# Patient Record
(demographics unavailable — no encounter records)

---

## 2024-10-08 NOTE — PHYSICAL EXAM
[de-identified] : General: Well appearing, no acute distress Neurologic: A&Ox3, No focal deficits Head: NCAT without abrasions, lacerations, or ecchymosis to head, face, or scalp Eyes: No scleral icterus, no gross abnormalities Respiratory: Equal chest wall expansion bilaterally, no accessory muscle use Lymphatic: No lymphadenopathy palpated Skin: Warm and dry Psychiatric: Normal mood and affect   Examination of the right shoulder reveals no obvious deformity.  There is no evidence of muscle atrophy.  The skin is intact.  There is no swelling, erythema, or ecchymosis.  The patient is nontender to palpation throughout the right shoulder including the anterior aspect of the shoulder near the bicipital groove, a.c. joint, trapezius and posterior shoulder.  The patient's active range of motion is as follows: Forward flexion to 110 degrees external rotation to 35 degrees and internal rotation to an upper lumbar level.  Passive forward flexion to 125 degrees. The patient has 5 out of 5 strength to forward flexion with pronation, internal and external rotation.  The patient has negative Waite and Neer's tests.  There is no pain with cross body abduction testing.  There is a negative a.c. compression test and no sulcus sign present.  There is a negative liftoff test and negative yergason test.  The compartments of the arm are soft and nontender without evidence of DVT or compartment syndrome.  The patient is grossly neurovascularly intact distally.   Examination of the left shoulder reveals no obvious deformity.  There is no evidence of muscle atrophy.  The skin is intact.  There is no swelling, erythema, or ecchymosis.  The patient is nontender to palpation throughout the left shoulder including the anterior aspect of the shoulder near the bicipital groove, a.c. joint, trapezius and posterior shoulder.  The patient's active range of motion is as follows: Forward flexion to 175 degrees external rotation to 75 degrees and internal rotation to an upper lumbar level.  The patient has 5 out of 5 strength to forward flexion with pronation, internal and external rotation.  The patient has negative Waite and Neer's tests.  There is no pain with cross body abduction testing.  There is a negative a.c. compression test and no sulcus sign present.  There is a negative liftoff test and negative yergason test.  The compartments of the arm are soft and nontender without evidence of DVT or compartment syndrome.  The patient is grossly neurovascularly intact distally. [de-identified] : The following radiographs were ordered and read by me during this patient's visit. I reviewed each radiograph in detail with the patient and discussed the findings as highlighted below. 4 views of the left shoulder were obtained today that show no fracture, dislocation. There is calcium deposit at L rotator cuff. There is no malalignment. No obvious osseous abnormality. Otherwise unremarkable.

## 2024-10-08 NOTE — HISTORY OF PRESENT ILLNESS
[de-identified] : PENNY is a 69 year female here today for follow up due to R shoulder. SPO: Right reverse total shoulder replacement; DOS: 6/05/2024. She is reporting pain to R shoulder with external rotation. She is reporting pain to her L shoulder at this time.

## 2024-10-08 NOTE — ADDENDUM
[FreeTextEntry1] : Documented by Lidia Pérez acting as a scribe for Dr. Bhatt and Patric Espinoza PA-C on 10/08/2024. All medical record entries made by the Scribe were at my, Dr. Bhatt, and Patric Espinoza's, direction and personally dictated by me on 10/08/2024. I have reviewed the chart and agree that the record accurately reflects my personal performance of the history, physical exam, procedure and imaging.

## 2024-10-08 NOTE — REASON FOR VISIT
[Follow-Up Visit] : a follow-up visit for [Shoulder Pain] : shoulder pain [FreeTextEntry2] : L shoulder, SPO: Right reverse total shoulder replacement; DOS: 6/05/2024

## 2024-10-08 NOTE — REVIEW OF SYSTEMS
[Negative] : Heme/Lymph [FreeTextEntry9] : L shoulder, SPO: Right reverse total shoulder replacement; DOS: 6/05/2024

## 2024-10-08 NOTE — PHYSICAL EXAM
[de-identified] : General: Well appearing, no acute distress Neurologic: A&Ox3, No focal deficits Head: NCAT without abrasions, lacerations, or ecchymosis to head, face, or scalp Eyes: No scleral icterus, no gross abnormalities Respiratory: Equal chest wall expansion bilaterally, no accessory muscle use Lymphatic: No lymphadenopathy palpated Skin: Warm and dry Psychiatric: Normal mood and affect   Examination of the right shoulder reveals no obvious deformity.  There is no evidence of muscle atrophy.  The skin is intact.  There is no swelling, erythema, or ecchymosis.  The patient is nontender to palpation throughout the right shoulder including the anterior aspect of the shoulder near the bicipital groove, a.c. joint, trapezius and posterior shoulder.  The patient's active range of motion is as follows: Forward flexion to 110 degrees external rotation to 35 degrees and internal rotation to an upper lumbar level.  Passive forward flexion to 125 degrees. The patient has 5 out of 5 strength to forward flexion with pronation, internal and external rotation.  The patient has negative Waite and Neer's tests.  There is no pain with cross body abduction testing.  There is a negative a.c. compression test and no sulcus sign present.  There is a negative liftoff test and negative yergason test.  The compartments of the arm are soft and nontender without evidence of DVT or compartment syndrome.  The patient is grossly neurovascularly intact distally.   Examination of the left shoulder reveals no obvious deformity.  There is no evidence of muscle atrophy.  The skin is intact.  There is no swelling, erythema, or ecchymosis.  The patient is nontender to palpation throughout the left shoulder including the anterior aspect of the shoulder near the bicipital groove, a.c. joint, trapezius and posterior shoulder.  The patient's active range of motion is as follows: Forward flexion to 175 degrees external rotation to 75 degrees and internal rotation to an upper lumbar level.  The patient has 5 out of 5 strength to forward flexion with pronation, internal and external rotation.  The patient has negative Waite and Neer's tests.  There is no pain with cross body abduction testing.  There is a negative a.c. compression test and no sulcus sign present.  There is a negative liftoff test and negative yergason test.  The compartments of the arm are soft and nontender without evidence of DVT or compartment syndrome.  The patient is grossly neurovascularly intact distally. [de-identified] : The following radiographs were ordered and read by me during this patient's visit. I reviewed each radiograph in detail with the patient and discussed the findings as highlighted below. 4 views of the left shoulder were obtained today that show no fracture, dislocation. There is calcium deposit at L rotator cuff. There is no malalignment. No obvious osseous abnormality. Otherwise unremarkable.

## 2024-10-08 NOTE — DISCUSSION/SUMMARY
[de-identified] : Patient is doing well post-operatively for her R shoulder. We had a thorough discussion regarding the nature of her pain, the pathophysiology, as well as all treatment options. I discussed operative and non-operative treatment modalities. Recommend patient follow up with radiologist for Barbotage injection for the calcium deposit in her L shoulder. A prescription of Celebrex was given to be taken as directed. All questions were answered and the patient verbalized understanding. The patient is in agreement with this treatment plan. Patient will follow up in 6-8 wks for repeat clinical assessment.

## 2024-10-08 NOTE — ADDENDUM
[FreeTextEntry1] : Documented by Lidia Pérez acting as a scribe for Dr. Bhatt and Patric Espinoza PA-C on 10/08/2024. All medical record entries made by the Scribe were at my, Dr. Bhatt, and Patric Espinzoa's, direction and personally dictated by me on 10/08/2024. I have reviewed the chart and agree that the record accurately reflects my personal performance of the history, physical exam, procedure and imaging.

## 2024-10-08 NOTE — CONSULT LETTER
[Dear  ___] : Dear  [unfilled], [Consult Letter:] : I had the pleasure of evaluating your patient, [unfilled]. [Please see my note below.] : Please see my note below. [Sincerely,] : Sincerely, [FreeTextEntry3] : Dr. Triston Bhatt

## 2024-10-08 NOTE — HISTORY OF PRESENT ILLNESS
[de-identified] : PENNY is a 69 year female here today for follow up due to R shoulder. SPO: Right reverse total shoulder replacement; DOS: 6/05/2024. She is reporting pain to R shoulder with external rotation. She is reporting pain to her L shoulder at this time.

## 2024-10-08 NOTE — DISCUSSION/SUMMARY
[de-identified] : Patient is doing well post-operatively for her R shoulder. We had a thorough discussion regarding the nature of her pain, the pathophysiology, as well as all treatment options. I discussed operative and non-operative treatment modalities. Recommend patient follow up with radiologist for Barbotage injection for the calcium deposit in her L shoulder. A prescription of Celebrex was given to be taken as directed. All questions were answered and the patient verbalized understanding. The patient is in agreement with this treatment plan. Patient will follow up in 6-8 wks for repeat clinical assessment.

## 2024-10-10 NOTE — ASSESSMENT
[FreeTextEntry1] : 69-year-old female with T2DM/EGJ outflow obstruction with jackhammer features diagnosed in 2017 status post Botox on the esophageal and GE junction with 100 units, also maintained on Ozempic, loperamide here for follow-up care. She was last seen in the office March 2024, and chest pain and dysphagia was resolved with diltiazem 360mg daily. At that time, we suggested that she continue her diltiazem 360mg daily, however since then she decreased the dose to 180mg given she thought it was contributing to her bilateral lower extremity edema, however with the decreased dose her chest pain and dysphagia have returned, likely related to her known esophageal pathology (recent hospitalization with cardiac stress testing was negative).   #Melena- hgb 10.8 09/2024, reports 1 month of intermittent melena, has a hx of oozing gastric ulcer seen on EGD 1/2024, and currently uses NSAIDs, concern for recurrent ulceration -CBC now with iron studies  -EGD in one week -Hold ozempic one week prior -Continue omeprazole 40mg BID   #EGJ outflow obstruction -Chest pain and dysphagia worse with lowering dose of diltiazem -Increase diltiazem 180mg to 360mg daily. -Discussed risk that it may contribute to bilateral leg edema, however low suspicion that this has been the cause as patient has venous insufficiency and symptoms have persisted despite stopping it for periods of time. -Discussed myotomy as a potential future option- patient would like to hold off on surgery at this time.   #Fecal incontinence -Has a long history of fecal incontinence, has neurostimulator in place, follows with CRC surgery. Takes Imodium once daily with continued resolution of her symptoms, passing stool 1-2x every day.  #CRC screening  -Last colonoscopy 2022, hyperplastic polyps, plan to repeat in 5 years.  Case discussed with Dr. White.  Tara Campoverde MD Gastroenterology/Hepatology PGY-4

## 2024-10-10 NOTE — END OF VISIT
[] : Fellow [FreeTextEntry3] : 69 female BMI 38 on ozempic/history of EGJ outflow obstruction with jackhammer features status post Botox then maintained on diltiazem up to 360, gastric ulcer seen in January 2024 in the setting of NSAID use here for follow-up care.  Patient still with ankle swelling, had titrated down her oral diltiazem and is now having recurrent esophageal symptoms of dysphagia/chest pain.  She is complaining of black stools.  1.  EGD to evaluate for recurrent ulcers 2. Increase diltiazem to 360 daily 3.  CBC today [Time Spent: ___ minutes] : I have spent [unfilled] minutes of time on the encounter which excludes teaching and separately reported services.

## 2024-10-10 NOTE — HISTORY OF PRESENT ILLNESS
[FreeTextEntry1] : 69-year-old female with T2DM/EGJ outflow obstruction with jackhammer features diagnosed in 2017 status post Botox on the esophageal and GE junction with 100 units, also maintained on Ozempic, loperamide here for follow-up care. She was last seen in the office February 2024, and was having chest pain and dysphagia with eating. At that time, we will likely her recurrent reflux/regurgitation is secondary to relative gastroparesis from Ozempic use and was started on PPI twice daily. We also restarted her on diltiazem, she is here for follow-up.  May 2024 felt much better since starting diltiazem. She no longer has any chest pain symptoms, was having some dysphagia typically to eating challenging meats such as pork or steak. Once in a while she will have dysphagia to drinking water and feels like she is about to choke. She has inadvertently started taking diltiazem twice daily (360mg) instead of once daily for the past 3 weeks, and only noticed when she ran out of her medications. She is having some fecal incontinence despite taking loperamide.  Current HPI: Since our last visit she had a shoulder replacement, complicated by flash pulmonary edema. She was also hospitalized at the end of September for recurrent chest pain, however stress testing was not suggestive of ischemia and ACS workup was negative.  Went down to 180mg diltiazem from 360mg one month ago, thought that it was causing leg swelling, though did not improve much with coming down on the medication, and mostly noticed improvement with the lasix that her cardiologist is prescribing. Since coming down on the diltiazem she is having reflux symptoms almost daily. Feels food is getting stuck in her esophagus for about a month. Denies any choking episodes. Taking omeprazole 40mg BID.   Was prescribed celebrex to take for left shoulder pain. Has not started it yet but was taking ibuprofen daily. She has noticed black stools about once a week for 1-2 months, denies any hematochezia.   Has a long history of fecal incontinence, has neurostimulator in place, follows with CRC surgery. Takes imodium once daily with continued resolution of her symptoms, passing stool 1-2x every other day.

## 2024-10-10 NOTE — PHYSICAL EXAM

## 2024-10-15 NOTE — HISTORY OF PRESENT ILLNESS
[FreeTextEntry1] : 69-year-old female with a history of urinary and fecal incontinence  status post placement of a sacral nerve stimulator in March 2020 who presents for follow-up.  She initially did very well with the nerve stimulator but is now having progressive episodes of fecal incontinence.  She has adjusted the settings in the past without improvement and is also having no success contacting the [employer] rep.  Her back problems have also worsened and she is now contemplating surgery for her chronic back pain.  She is unable to get MRIs because of her sacral nerve stimulator and she also has a pain pump.  Based on imaging, she was told the sacral nerve stimulator may have moved in location.  She continues to enjoy the benefits from a urinary standpoint but is not doing very well from the fecal incontinence standpoint.  She uses Imodium at least daily to help bind her stool.  She is unable to sense when she is passing stool.

## 2024-10-15 NOTE — PHYSICAL EXAM
[Respiratory Effort] : normal respiratory effort [Calm] : calm [de-identified] : Neurostimulator device location is palpable in left lower back and area is intact without any tenderness. [de-identified] : well appearing, in no distress [de-identified] : normocephalic, atraumatic [de-identified] : warm and dry [de-identified] : alert and oriented x 3

## 2024-10-15 NOTE — PLAN
[TextEntry] : 69-year-old female with fecal incontinence.  She already has a sacral nerve stimulator that is not functioning well for the fecal incontinence although is helping her urinary symptoms.  I discussed her options of removal of the current device with replacement with another 1.  I will likely place an [employer] device if she chooses this which is MRI compatible.  She understands her removal of the current device will affect her urinary symptoms which have been helped.  She states that imaging may have noted migration of the device but I explained that the battery box itself may not imply movement of the electrode and where it communicates with the nerve.  I did not have access to any imaging at this time but clearly shows migration of the lead.  She is also contemplating back surgery giving her worsening chronic back pain I explained that the timing of sacral nerve stimulator removal and reinsertion should be discussed with her back surgeon as to what is best to do it before or after her back surgery.  She does have loose stools and I explained that she will likely continue to need Imodium to bind her stools regardless of what options are taken.  She will spend time contemplating options and let me know.  In the interim, I will try to contact the [employer] rep to see if there is any additional support that can be offered with her current device.

## 2024-10-22 NOTE — ASSESSMENT
[FreeTextEntry1] : Recommend increase in gabapentin to TID for peripheral neuropathy. Will be making an appointment with podiatry.   Reviewed upcoming specialty appointments.  I am providing continuous care for this patient that includes testing, discussion of options for treatment, and shared decision making with regard to the chosen treatment.

## 2024-10-22 NOTE — REVIEW OF SYSTEMS
[Abdominal Pain] : abdominal pain [Joint Pain] : joint pain [Joint Swelling] : joint swelling [Negative] : Genitourinary [FreeTextEntry7] : had dark stools but now resolved  [de-identified] : peripheral neuropathy

## 2024-10-22 NOTE — HISTORY OF PRESENT ILLNESS
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 She was admitted for cardiac workup 2/2 chest pain which has resolved now. Pt had a work up to for ACS syndrome which was negative. She was also treated for UTI. Today she is back to her baseline. Recently had endoscopy without new findings.   Discharge Medications  Aspirin EC 81 mg oral delayed release tablet: 1 tab(s) orally once a day MDD: 1 atorvastatin 40 mg oral tablet: 1 tab(s) orally once a day (at bedtime) biotin 1000 mcg oral tablet: 1 tab(s) orally once a day diclofenac sodium 75 mg oral delayed release tablet: 1 tab(s) orally 2 times a day as needed for severe pain Diltia  mg/24 hours oral capsule, extended release: 1 cap(s) orally once a day gabapentin 300 mg oral capsule: 1 cap(s) orally 2 times a day isosorbide dinitrate 5 mg oral tablet: 1 tab(s) orally 2 times a day Lyrica 100 mg oral capsule: 1 cap(s) orally 2 times a day metFORMIN 500 mg oral tablet: 1 tab(s) orally 2 times a day omeprazole 40 mg oral delayed release capsule: 1 cap(s) orally 2 times a day Ozempic 2 mg/1.5 mL (0.25 mg or 0.5 mg dose) subcutaneous solution: 0.5 milligram(s) subcutaneously once a week tiZANidine 2 mg oral capsule: 2 cap(s) orally 2 times a day as needed for moderate pain Vitamin D3 1250 mcg (50,000 intl units) oral capsule: 1 cap(s) orally once a week

## 2024-12-05 NOTE — PHYSICAL EXAM
[de-identified] : Physical Exam:  General: Well appearing, no acute distress  Neurologic: A&Ox3, No focal deficits  Head: NCAT without abrasions, lacerations, or ecchymosis to head, face, or scalp  Eyes: No scleral icterus, no gross abnormalities  Respiratory: Equal chest wall expansion bilaterally, no accessory muscle use  Lymphatic: No lymphadenopathy palpated  Skin: Warm and dry  Psychiatric: Normal mood and affect  Right shoulder incision is well-healed.  She has active and passive forward flexion to 125 degrees external rotation to 40 degrees.  Left shoulder demonstrates forward flexion to 1 60 degrees with good internal and external rotation.  Negative impingement signs.  Compartments are soft and nontender bilaterally.  She is grossly neurovascular intact distally.

## 2024-12-05 NOTE — HISTORY OF PRESENT ILLNESS
[de-identified] : The patient is a 70-year-old female here today 6 months status post reverse right shoulder arthroplasty.  She also had calcific tendinitis of the left shoulder.  She states her shoulder pain improved on the left and she did not perform a barbotage procedure.  She is doing well overall on the left.  Right side continues to progress.  She is getting more motion.  She has occasional stiffness and soreness.  She attended physical therapy and is considering home program.  She has upcoming dental work.

## 2024-12-05 NOTE — DISCUSSION/SUMMARY
[de-identified] : I had a discussion with the patient and her .  At this time she will continue with a home program and this was provided to her.  In terms of the left shoulder I recommended no further treatment.  We discussed dental prophylaxis prior to any procedures and dental work.  Antibiotic was prescribed with a probiotic.  She will follow-up here in 6 months.  All questions were answered.

## 2024-12-06 NOTE — DISCUSSION/SUMMARY
[FreeTextEntry1] : PENNY is a 70 year female who presents for On exam, negative CST, normal PVR, no POP.   [] []   f/u All questions answered.

## 2024-12-06 NOTE — HISTORY OF PRESENT ILLNESS
[FreeTextEntry1] : PENNY is a 70 year female who presents for fecal incontinence She was referred by    She is s/p SNM with [employer] device in 03/2020 with initial improvement in both urinary and fecal incontinence but now has c/o fecal incontinence.   She recently saw Dr Feliciano ([facility]) who offered removal of the [employer] SNM and replacement with an [employer] SNM.   UA w/ micro 06/13/2024 - Moderate leukocyte esterase, Few bacteria, 11 Epithelial cells  UA w/ micro 05/31/2024 - Cloudy, Protein 30mg/dL, Moderate leukocyte esterase, 12 WBCs, Few bacteria, 30 Epithelial cells Positive Urine Cx 05/31/2024 - >100,000 CFU/ml Escherichia coli ESBL, >100,000 CFU/ml Enterococcus faecalis   Daytime frequency:  Nocturia:  Urinary urgency:  Leakage of urine with urgency:  Leakage of urine with coughing sneezing laughing:  Incontinence pad use:  Sensation of incomplete bladder emptying:  History of frequent urinary tract infections:  History of hematuria:  Previous treatment:  Vaginal symptoms:  Bowel symptoms:      OB:  GYN: LMP, pap, estrogen use PMH:  PSH:  Meds:  Allx:

## 2024-12-06 NOTE — ADDENDUM
[FreeTextEntry1] : This note was written by Asya Olmedo, acting as the  for Dr. Isidro. This note accurately reflects the work and decisions made by Dr. Isidro.

## 2024-12-10 NOTE — PHYSICAL EXAM
[de-identified] : Bilateral hand -No atrophy, she does report numbness at baseline mostly in her fingertips of all digits greatest at the index and middle finger, worse on her right hand -She does have a positive Tinel's over the right wrist, no Tinel's over the left wrist, no worsening of baseline numbness with Durkan or Phalen maneuver - Thenar muscular strength and bulk normal.  No atrophy - hand and digits warm well perfused, able to make full composite fist without difficulty.   -Negative Tinel's over the elbow, no palpable subluxation of the ulnar nerve  -Bilateral hands are swollen, diffusely.  She has focal tenderness palpation over multiple A1 pulleys.  On the right hand she has severe tenderness over the index and middle finger on the left hand she has the most tenderness over the middle ring and small finger.  [de-identified] : Three-view x-ray of bilateral hand including AP, lateral bleak were taken today and personally viewed these demonstrate mild arthritis of bilateral CMC joints no fractures

## 2024-12-10 NOTE — PROCEDURE
[FreeTextEntry1] :  Verbal consent given after informed discussion regarding risks, benefits and alternatives to a corticosteroid injection.  Risks including infection, flare reaction, pain, and skin changes were discussed in detail.  The right index finger and middle finger were confirmed to be the correct site.  The skin was anesthetized using of the chloride solution spray and prepped with alcohol.  An injection was performed using 6 mg of betamethasone mixed with 1 cc of 1% lidocaine into the A1 pulley of the right middle finger and right index finger.  The patient tolerated the procedure well.  Advised to ice and elevate tonight and that the full effects can take 48 to 72 hours.

## 2024-12-10 NOTE — ASSESSMENT
[FreeTextEntry1] : - 70-year-old female, recurrent numbness after her carpal tunnels were released 3 years ago.  She does report significant improvement in the symptoms did not have a return to baseline but it did improve after the carpal tunnel surgery they began to worsen over the past 6 months.  She does have multiple confounding features including her right-sided shoulder status post reverse total arthroplasty, her cervical neck disease as well as her diabetes this may be a component of double crush versus cervical radiculopathy alone.  This time we will tendon EMG to evaluate and try to localize peripheral nerve root distribution causing the symptoms greatest on her right hands  She has multiple trigger fingers of bilateral hands and digits, we discussed how these are associated with diabetes.  She has had no injections for triggering in the past she reports.  We did discuss different treatment modalities.  We discussed the role of hand therapy given the diffuse swelling and multiple digit involvement of her trigger fingers.  We discussed the role of oral corticosteroids as we cannot inject every finger today however she does have a history of an esophageal problem and is unable to take steroids as far she knows.  We discussed the risks associated with steroid injection and to monitor her blood sugar, we will focus on her right hand today as this is causing her the significant amount of symptoms in her right index and middle finger were injected today.  She can come back in a week or 2 and we can do the left side if there is still pain that is persisting.  She reports she is unable to go to hand therapy until the beginning of the new calendar year due to insurance issues.  She will follow-up after the EMG is obtained

## 2024-12-10 NOTE — HISTORY OF PRESENT ILLNESS
[FreeTextEntry1] : 70-year-old female presenting for evaluation of bilateral hand pain swelling and numbness. -She has a history of a carpal tunnel release done in 2022 with Dr. Hayden.  She reports she had significant improvement in pain and numbness after the surgeries.  She reports over the past 6 months she has began having recurrent numbness mostly in her right hand is mostly in her index and middle finger.  She has a history of diabetes but reports it is well-controlled with an A1c of 5.5.  She had worked with hand therapy and physical therapy prior.  She had swelling of her fingers recently and occasional clicking locking and catching of multiple digits.  She has had no treatment thus far.  In addition she does have known shoulder issues on the right side status post reverse total shoulder arthroplasty done approximately 4 to 5 months ago as well as cervical neck issues.

## 2025-01-09 NOTE — ASSESSMENT
[FreeTextEntry1] : 70-year-old female with HTN/T2DM on Ozempic/BMI 36/history of EGJ outflow obstruction and hypercontractile esophagus as seen on manometry in 2017/2021 here for follow-up care, with complaints of recurrent dysphagia despite being on calcium channel blockers and addition of isosorbide dinitrate.  Patient with also recurrent intermittent black stools and iron deficiency anemia, mild on blood work performed October 2024.  1.  EGJ outflow obstruction/hypercontractile esophagus in the past, not responsive to calcium channel blockers Manolo for repeat barium esophagram with tablet challenge Plan for repeat esophageal manometry Patient may need POEM versus pneumatic dilation depending on what new studies show  2.  Iron deficiency anemia Normal: 2022 Small erosion 10/2024 Plan for video capsule endoscopy to rule out small bowel etiology of recurrent melena and FERMIN  3.  Follow-up with me in 2 months posttesting

## 2025-01-09 NOTE — HISTORY OF PRESENT ILLNESS
[FreeTextEntry1] : 70 year old female with T2DM/EGJ outflow obstruction/hypercontractile esophagus diagnosed in 2017 status post Botox to the esophagus/GE junction with 100 units here for follow-up care.  Patient was last seen October 2024, was having chest pain/dysphagia with eating, and diltiazem was increased to 360 mg daily.  Patient had recent endoscopy for reports of black stools, endoscopy was negative for recurrent ulcers.  Hemoglobin found to be 11.1, with a hematocrit of 36.8, MCV was 88.9 with a platelet count of 333.  Serum iron was 36, TIBC 412, percent saturation was 9.  Last colonoscopy performed February 2022 found to have early hyperplastic polyp at that time in the descending colon, some internal hemorrhoids.  Patient states that she does see black stools but only when she takes diclofenac.  She is mainly complaining of dysphagia which has worsened since our last visit despite being on diltiazem and isosorbide.  She has dysphagia with pills, as well as solid food.  She does feel some chest tightness with eating.  She has not had any weight loss or regurgitation.  She has had no instances of food impaction. EGJ status post Botox 100 units in January 2023

## 2025-01-29 NOTE — HISTORY OF PRESENT ILLNESS
[de-identified] : Ms. MENDEZ is a 70 year-old female who presents with history of cerumen impaction, increased itching in her right ear and hearing loss.  She says that she has been using mineral oil, to her right ear when it itches.  Though it helps, it does not completely resolve the itching.  She also feels pressure and was told that there are times her hearing is not as good. [Hearing Loss] : hearing loss [Ear Fullness] : ear fullness

## 2025-01-29 NOTE — CONSULT LETTER
[Dear  ___] : Dear  [unfilled], [Courtesy Letter:] : I had the pleasure of seeing your patient, [unfilled], in my office today. [Please see my note below.] : Please see my note below. [Sincerely,] : Sincerely, [FreeTextEntry2] : Ana Patricio DO ([facility] Physician) [FreeTextEntry3] : Tomy Delgadillo, LEYLA, PAAlfredoC Sr. Physician Assistant, Otolaryngology at [facility] Adjunct Clinical Instructor, Department of Physician Assistant Studies, [facility] [address] [address]

## 2025-01-29 NOTE — PHYSICAL EXAM
[de-identified] : +cerumen deep in right ear canal - removed. [de-identified] : dusky right TM [Nasal Endoscopy Performed] : nasal endoscopy was performed, see procedure section for findings [de-identified] : dry mucosa [Midline] : trachea located in midline position [Normal] : no rashes

## 2025-01-29 NOTE — ASSESSMENT
[FreeTextEntry1] : -  Cerumen removed from deep in her right ear canal -  Loose cerumen that is not impacted together likely fell in deeper after use of mineral oil -  Ear canal normal -  Fluocinolone Otic BID to right ear -  Hearing test -  Follow up in 6 months or PRN

## 2025-01-29 NOTE — PROCEDURE
[Anterior rhinoscopy insufficient to account for symptoms] : anterior rhinoscopy insufficient to account for symptoms [Topical Lidocaine] : topical lidocaine [Oxymetazoline HCl] : oxymetazoline HCl [Flexible Endoscope] : examined with the flexible endoscope [Serial Number: ___] : Serial Number: [unfilled] [Red] : red [Normal] : the inferior, middle and superior meatus are normal [FreeTextEntry1] : dry nasal mucosa [Risk and Benefits Discussed] : The purpose, risks, discomforts, benefits and alternatives of the procedure have been explained to the patient including no treatment. [Cerumen Impaction] : Cerumen Impaction [Same] : same as the Pre Op Dx. [] : Removal of Cerumen [FreeTextEntry6] : After informed verbal consent is obtained, cerumen is removed deep from the right ear canal . The cerumen was removed using a microscope and suctioning. The patient tolerated the procedure well.

## 2025-01-29 NOTE — REASON FOR VISIT
[Initial Consultation] : an initial consultation for [FreeTextEntry2] : itchy ears, hearing loss, cerumen

## 2025-02-13 NOTE — REVIEW OF SYSTEMS
[Heartburn] : heartburn [Back Pain] : back pain [Negative] : Respiratory [FreeTextEntry3] : cataract (left eye)  [FreeTextEntry7] : loose bowels [FreeTextEntry9] : chronic back pain

## 2025-02-13 NOTE — HISTORY OF PRESENT ILLNESS
[FreeTextEntry1] : Diabetes f/u [de-identified] : 70 year-old female with a history of mild persistent asthma, moderate ANTIONE, allergic rhinitis, chronic low back pain d/t multilevel spondylosis and foraminal stenosis s/p neurogenic stimulator in place, hypertension, hyperlipidemia, DM2 (not insulin-dependent), fibromyalgia, GERD, and esophagogastric junction outflow obstruction with hypercontractile esophagus who presents for diabetes follow up.  She reports ongoing esophageal spasms causing choking sensations and discomfort when swallowing hot liquids. They are currently undergoing tests to confirm the diagnosis and potential need for surgery. The patient has been on Ozempic for diabetes management and weight loss for about four years, starting at 240 lbs and now down to 208 lbs. They are interested in potentially switching to Mounjaro for better weight loss results. The patient also mentions upcoming cataract surgery. Regarding chronic back pain, the patient has had multiple epidural injections (about 15) in the past with diminishing effectiveness. They are currently using a neurostimulator for pain management but report ongoing issues.

## 2025-02-13 NOTE — PHYSICAL EXAM
[No Edema] : there was no peripheral edema [Coordination Grossly Intact] : coordination grossly intact [No Focal Deficits] : no focal deficits [Normal] : affect was normal and insight and judgment were intact [Right Foot Was Examined] : Right foot ~C was examined [Left Foot Was Examined] : left foot ~C was examined [None] : no ulcers in either foot were found

## 2025-02-26 NOTE — REASON FOR VISIT
[Telehealth (audio & video)] : This visit was provided via telehealth using real-time 2-way audio visual technology. [Other:___] : SHIRA [Follow-up] : a follow-up of an existing diagnosis [Other Location: e.g. School (Enter Location, City,State)___] : at [unfilled], at the time of the visit. [Medical Office: ([facility])___] : at the medical office located in  [Verbal consent obtained from patient] : the patient, [unfilled]

## 2025-02-26 NOTE — HISTORY OF PRESENT ILLNESS
[FreeTextEntry1] : Switched to [employer] platform with video due to technical difficulties using [employer] video miguel  70-year-old pleasant female with T2DM/EGJ outflow obstruction with jackhammer features diagnosed in 2017 status post Botox to the esophagus and GE junction in 2017, recurrent Botox just to the GE junction in 2023 here for follow-up care.  She had a repeat esophageal manometry performed last month, which demonstrated jackhammer features and EGJ outflow obstruction.  This was done while she was taking diltiazem.  She also had been complaining of recurrent black stools, iron deficiency, had multiple EGDs, initially 1 with ulcer, but repeat endoscopy demonstrated no further ulceration.  She had VCE performed 2/6 which demonstrated denuded red spot in the proximal small bowel 17% into the study.

## 2025-02-26 NOTE — HISTORY OF PRESENT ILLNESS
[FreeTextEntry1] : Switched to [employer] platform with video due to technical difficulties using [employer] video miguel  70-year-old pleasant female with T2DM/EGJ outflow obstruction with jackhammer features diagnosed in 2017 status post Botox to the esophagus and GE junction in 2017, recurrent Botox just to the GE junction in 2023 here for follow-up care.  She had a repeat esophageal manometry performed last month, which demonstrated jackhammer features and EGJ outflow obstruction.  This was done while she was taking diltiazem.  She also had been complaining of recurrent black stools, iron deficiency, had multiple EGDs, initially 1 with ulcer, but repeat endoscopy demonstrated no further ulceration.  She had VCE performed 2/6 which demonstrated denuded red spot in the proximal small bowel 17% into the study.

## 2025-02-26 NOTE — REASON FOR VISIT
[Telehealth (audio & video)] : This visit was provided via telehealth using real-time 2-way audio visual technology. [Other:___] : SHIRA [Follow-up] : a follow-up of an existing diagnosis [Other Location: e.g. School (Enter Location, City,State)___] : at [unfilled], at the time of the visit. [Medical Office: ([facility])___] : at the medical office located in  [Verbal consent obtained from patient] : the patient, [unfilled]

## 2025-02-26 NOTE — ASSESSMENT
[FreeTextEntry1] : 70-year-old female with T2DM on Ozempic/EGJ outflow obstruction with hiatal hernia and jackhammer esophagus on diltiazem 360 daily, here for discussion of test results both esophageal manometry and VCE.  Given recurrent symptoms of chest pain/dysphagia, we will plan on performing another Botox to the LES/esophageal body.  In terms of FERMIN/melena, plan for push enteroscopy to evaluate area of denuded mucosa seen on VCE.  1.  Push enteroscopy with Botox to LES/esophageal body Hold Ozempic x 1 week The risks, benefits and alternatives of the procedure were discussed with the patient in detail. Risks include bleeding, infection, bowel perforation, adverse reaction to sedation and missed lesions. All questions were answered. The patient expressed understanding of these risks and is agreeable to proceed.

## 2025-03-25 NOTE — DISCUSSION/SUMMARY
[de-identified] : We had a thorough discussion regarding the nature of her pain, the pathophysiology, as well as all treatment options. I discussed operative and non-operative treatment modalities. A prescription of Celebrex was given to be taken as directed. Patient notes that she is scheduled for endoscopy on 03/26/2025. I advised patient to ask GI specialist if she is cleared to take Celebrex after the procedure. Patient was given prescription of formal physical therapy that she will perform 2x/wk for 6-8 wks. Provided patient with spinal specialists to follow up with for further evaluation of her symptoms. All questions were answered and the patient verbalized understanding. The patient is in agreement with this treatment plan. Patient will follow up as needed for repeat clinical assessment.

## 2025-03-25 NOTE — PHYSICAL EXAM
[de-identified] : Physical Exam:  General: Well appearing, no acute distress  Neurologic: A&Ox3, No focal deficits  Head: NCAT without abrasions, lacerations, or ecchymosis to head, face, or scalp  Eyes: No scleral icterus, no gross abnormalities  Respiratory: Equal chest wall expansion bilaterally, no accessory muscle use  Lymphatic: No lymphadenopathy palpated  Skin: Warm and dry  Psychiatric: Normal mood and affect  Right shoulder incision is well-healed.  She has active and passive forward flexion to 125 degrees external rotation to 40 degrees.  Left shoulder demonstrates forward flexion to 160 degrees with good internal and external rotation. Tender over deltoid.  Negative impingement signs.  Compartments are soft and nontender bilaterally.  She is grossly neurovascular intact distally.   [de-identified] : The following radiographs were ordered and read by me during this patient's visit. I reviewed each radiograph in detail with the patient and discussed the findings as highlighted below. 3 views of the right shoulder were obtained today that show no fracture, dislocation. There is no degenerative change seen. There is no malalignment. No obvious osseous abnormality. Otherwise unremarkable. Implant intact and unchanged from prior imaging.

## 2025-03-25 NOTE — HISTORY OF PRESENT ILLNESS
[de-identified] : The patient is a 70-year-old female here today for follow up due to R shoulder. Of note, reverse right shoulder arthroplasty; DOS: 06/05/2024. She also had calcific tendinitis of the left shoulder.  She reports that she had a minor injury/fall to her shoulder. She wanted to present to have her shoulder evaluated as a precaution. Points to her anterior R shoulder as the source of her pain. She states that she is unable to lift her arm due to this pain. Of note, mentions that she has neural stimulators in place and believes they have moved. She previously followed with Dr. Espinoza who indicated her for a fusion however patient is trying to avoid this and is asking for spinal specialist recommendations.

## 2025-03-25 NOTE — HISTORY OF PRESENT ILLNESS
[de-identified] : The patient is a 70-year-old female here today for follow up due to R shoulder. Of note, reverse right shoulder arthroplasty; DOS: 06/05/2024. She also had calcific tendinitis of the left shoulder.  She reports that she had a minor injury/fall to her shoulder. She wanted to present to have her shoulder evaluated as a precaution. Points to her anterior R shoulder as the source of her pain. She states that she is unable to lift her arm due to this pain. Of note, mentions that she has neural stimulators in place and believes they have moved. She previously followed with Dr. Espinoza who indicated her for a fusion however patient is trying to avoid this and is asking for spinal specialist recommendations.

## 2025-03-25 NOTE — DISCUSSION/SUMMARY
[de-identified] : We had a thorough discussion regarding the nature of her pain, the pathophysiology, as well as all treatment options. I discussed operative and non-operative treatment modalities. A prescription of Celebrex was given to be taken as directed. Patient notes that she is scheduled for endoscopy on 03/26/2025. I advised patient to ask GI specialist if she is cleared to take Celebrex after the procedure. Patient was given prescription of formal physical therapy that she will perform 2x/wk for 6-8 wks. Provided patient with spinal specialists to follow up with for further evaluation of her symptoms. All questions were answered and the patient verbalized understanding. The patient is in agreement with this treatment plan. Patient will follow up as needed for repeat clinical assessment.

## 2025-03-25 NOTE — PHYSICAL EXAM
[de-identified] : Physical Exam:  General: Well appearing, no acute distress  Neurologic: A&Ox3, No focal deficits  Head: NCAT without abrasions, lacerations, or ecchymosis to head, face, or scalp  Eyes: No scleral icterus, no gross abnormalities  Respiratory: Equal chest wall expansion bilaterally, no accessory muscle use  Lymphatic: No lymphadenopathy palpated  Skin: Warm and dry  Psychiatric: Normal mood and affect  Right shoulder incision is well-healed.  She has active and passive forward flexion to 125 degrees external rotation to 40 degrees.  Left shoulder demonstrates forward flexion to 160 degrees with good internal and external rotation. Tender over deltoid.  Negative impingement signs.  Compartments are soft and nontender bilaterally.  She is grossly neurovascular intact distally.   [de-identified] : The following radiographs were ordered and read by me during this patient's visit. I reviewed each radiograph in detail with the patient and discussed the findings as highlighted below. 3 views of the right shoulder were obtained today that show no fracture, dislocation. There is no degenerative change seen. There is no malalignment. No obvious osseous abnormality. Otherwise unremarkable. Implant intact and unchanged from prior imaging.

## 2025-03-25 NOTE — ADDENDUM
[FreeTextEntry1] : Documented by Lidia Pérez acting as a scribe for Dr. Bhatt and Patric Espinoza PA-C on 03/25/2025. All medical record entries made by the Scribe were at my, Dr. Bhatt, and Patric Espinoza's, direction and personally dictated by me on 03/25/2025. I have reviewed the chart and agree that the record accurately reflects my personal performance of the history, physical exam, procedure and imaging.

## 2025-04-28 NOTE — REVIEW OF SYSTEMS
[Nasal Congestion] : nasal congestion [Postnasal Drip] : postnasal drip [Dyspnea] : dyspnea [Negative] : Endocrine

## 2025-05-01 NOTE — ASSESSMENT
[FreeTextEntry1] : -  Ms. Fleming is a 70-year-old female with a history of mild persistent asthma, allergic rhinitis, hypertension, hyperlipidemia, DM2 (not insulin-dependent), fibromyalgia, GERD, and esophagogastric junction outflow obstruction with hypercontractile esophagus who presents for follow-up. She takes Arnuity Ellipta inhaler daily. she recently had an asthma flare about 1-2 weeks ago with symptoms of dyspnea, cough with green phlegm, wheezing, and chest tightness. She took prednisone for 3 days and albuterol once daily with resolution of symptoms. Symptoms improved by yesterday. No longer taking prednisone. Requesting refill for albuterol and increasing Arnuity Ellipta. She recently had endoscopy with Dr. Lary White in March s/p Botox injection with improvement. Of note, she was previously on Symbicort, but developed tremors so was switched to pure ICS therapy. Prior peak flow personal best was 350, although recently as low as 200, now improved to 225. She also takes fluticasone nasal spray twice daily for her allergic rhinitis. Allergic triggers include mold, mildew, pollen, grass, trees, cats, and dust. She also has a history of moderate ANTIONE (HST in April 2022), previously on CPAP every night but did not tolerate. Last use in December, but again she is unable to use tolerate CPAP due to discomfort with the mask and machine. She was previously referred to dentistry for mandibular advancement oral appliance therapy.   PFTs (Dec 2021) with nonspecific pattern of reduced FEV1 with normal FVC and normal FEV1/FVC ratio with normal lung volumes and mildly reduced diffusing capacity. ERV is reduced due to obesity.   CXR (2018) with clear lungs.  # Mild persistent asthma without complication: - PFTs Dec 2021 with nonspecific pattern of reduced FEV1 with normal FVC and normal FEV1/FVC ratio with normal lung volumes and mildly reduced diffusing capacity. ERV is reduced due to obesity. This can be seen in obesity and asthma, among other conditions - Continue to monitor peak flows, recently as low at 200, but now improved to 225 - Continue Arnuity Ellipta inhaler 200 mcg 1 inhalation daily, rinse after use - Consider repeat PFTs   # Allergic rhinitis: - Continue Fluticasone nasal spray 1 spray per nostril twice daily - Avoid allergens, including mold, mildew, pollen, grass, trees, cats, dust  # Moderate ANTIONE: - Home sleep apnea testing in April 2022 with moderate ANTIONE, AHI 16.7, with mild to moderate oxygen desaturation. Daron oxygen saturation was 80% with T90 4% - She remains unable to tolerate nocturnal CPAP therapy due to discomfort with mask - Given only moderate ANTIONE, recommend evaluation with dentistry for mandibular advancement oral appliance therapy  - Can also consider positional therapy given that ANTIONE was less severe in the left lateral decubitus position compared to right lateral decubitus or supine position  # HCM: - Up to date with COVID-19, pneumococcal, and influenza vaccinations - Follow-up in 3 months or sooner as necessary

## 2025-05-01 NOTE — HISTORY OF PRESENT ILLNESS
[Never] : never [TextBox_4] : Ms. Fleming is a 70-year-old female with a history of mild persistent asthma, allergic rhinitis, hypertension, hyperlipidemia, DM2 (not insulin-dependent), fibromyalgia, GERD, and esophagogastric junction outflow obstruction with hypercontractile esophagus who presents for follow-up. She takes Arnuity Ellipta inhaler daily. she recently had an asthma flare about 1-2 weeks ago with symptoms of dyspnea, cough with green phlegm, wheezing, and chest tightness. She took prednisone for 3 days and albuterol once daily with resolution of symptoms. Symptoms improved by yesterday. No longer taking prednisone. Requesting refill for albuterol and increasing Arnuity Ellipta. She recently had endoscopy with Dr. Lary White in March s/p Botox injection with improvement. Of note, she was previously on Symbicort, but developed tremors so was switched to pure ICS therapy. Prior peak flow personal best was 350, although recently as low as 200, now improved to 225. She also takes fluticasone nasal spray twice daily for her allergic rhinitis. Allergic triggers include mold, mildew, pollen, grass, trees, cats, and dust. She also has a history of moderate ANTIONE (HST in April 2022), previously on CPAP every night but did not tolerate. Last use in December, but again she is unable to use tolerate CPAP due to discomfort with the mask and machine. She was previously referred to dentistry for mandibular advancement oral appliance therapy.   PFTs (Dec 2021) with nonspecific pattern of reduced FEV1 with normal FVC and normal FEV1/FVC ratio with normal lung volumes and mildly reduced diffusing capacity. ERV is reduced due to obesity.   CXR (2018) with clear lungs.  Up to date with COVID and pneumococcal vaccinations.

## 2025-05-20 NOTE — PROCEDURE
[Risk and Benefits Discussed] : The purpose, risks, discomforts, benefits and alternatives of the procedure have been explained to the patient including no treatment. [Cerumen Impaction] : Cerumen Impaction [Same] : same as the Pre Op Dx. [] : Removal of Cerumen [FreeTextEntry6] : After informed verbal consent is obtained, cerumen is removed from both ear canals using a microscope and suctioning. The patient tolerated the procedure well.

## 2025-05-20 NOTE — CONSULT LETTER
[Dear  ___] : Dear  [unfilled], [Courtesy Letter:] : I had the pleasure of seeing your patient, [unfilled], in my office today. [Please see my note below.] : Please see my note below. [Sincerely,] : Sincerely, [FreeTextEntry2] : Ana Patricio DO ([facility] Physician) [FreeTextEntry3] : Tomy Delgadillo, LEYLA, PAAlfredoC Sr. Physician Assistant, Otolaryngology at [facility] [place] Adjunct Clinical Instructor, Department of Physician Assistant Studies, [facility] [place] [address] [address] [___] : [unfilled]

## 2025-05-20 NOTE — HISTORY OF PRESENT ILLNESS
[FreeTextEntry8] : 70 year-old female with a history of mild persistent asthma, moderate ANTIONE, allergic rhinitis, chronic low back pain d/t multilevel spondylosis and foraminal stenosis s/p neurogenic stimulator in place, hypertension, hyperlipidemia, DM2 (not insulin-dependent), fibromyalgia, GERD, and esophagogastric junction outflow obstruction with hypercontractile esophagus who presents for elevated blood pressure and chest pain. She presents with concerns about persistently high blood pressure readings at home, ranging from 158-160 mmHg systolic. She reports experiencing a 'sting'-like sensation on the left side of her chest that started over the weekend. The pain is described as an 'inside feeling' and is not associated with food intake. The patient admits to not taking her blood pressure medications today before the appointment. She also mentions feeling palpitations when the chest pain occurs. The patient is scheduled to travel to [place] tomorrow, which adds urgency to her medical evaluation.   - Past Medical History: The patient has a history of hypertension and asthma. She has been under the care of a cardiologist, Dr. Richardson, who adjusted her medications in the past.    Review of Systems:     - General: Recent weight loss noted by the provider     - Neurological: No specific neurological symptoms mentioned     - Musculoskeletal: No specific musculoskeletal symptoms mentioned     - Cardiovascular: Reports palpitations associated with chest pain     - Respiratory: History of asthma, no current respiratory complaints mentioned     - Gastrointestinal: No specific gastrointestinal symptoms mentioned     - Genitourinary: No specific genitourinary symptoms mentioned     - Integumentary: No specific skin issues mentioned     - Psychiatric: No specific psychiatric symptoms mentioned

## 2025-05-20 NOTE — PHYSICAL EXAM
[de-identified] : +cerumen deep in both ear canals - removed. [Midline] : trachea located in midline position [Normal] : no rashes

## 2025-05-20 NOTE — HISTORY OF PRESENT ILLNESS
[de-identified] : Ms. MENDEZ is a 70-year-old female who presents with hearing loss and cerumen.  She had a hearing test at [facility] and was found to have low-frequency mixed hearing loss.  HAE was recommended, which she is considering.  When she was last seen in the office, she had cerumen deep in her ear canal, which was deep in both ear canals.   [Hearing Loss] : hearing loss [Ear Fullness] : ear fullness

## 2025-06-09 NOTE — DISCUSSION/SUMMARY
[Medication Risks Reviewed] : Medication risks reviewed [Surgical risks reviewed] : Surgical risks reviewed [Other: ____] : in [unfilled] [de-identified] : I discussed the underlying pathophysiology of the patient's condition. The patient continues to be 100% disabled regarding her lower back and bilateral knees. Poor for complete recovery. Activity modifications were reviewed with the patient at length.  Patient will continue to see her pain management specialist.  She is referred to our knee specialist to determine whether or not further workup and treatment is indicated regarding the right knee replacement.  She will follow-up with us in 1 year.

## 2025-06-09 NOTE — END OF VISIT
[FreeTextEntry3] : I saw and evaluated the patient and discussed the care with the NP provider above on 07/28/2022 . I agree with the findings and plan as documented in the note above. She will increase her Dilt. Add isordil. Monitor bp at home. fu with GI.  [Time Spent: ___ minutes] : I have spent [unfilled] minutes of time on the encounter which excludes teaching and separately reported services.

## 2025-06-09 NOTE — PHYSICAL EXAM
[Well Developed] : well developed [No Acute Distress] : no acute distress [Well Nourished] : well nourished [Normal Venous Pressure] : normal venous pressure [Normal Conjunctiva] : normal conjunctiva [No Carotid Bruit] : no carotid bruit [Normal Rate] : normal [Rhythm Regular] : regular [Normal S1] : normal S1 [I] : a grade 1 [Normal S2] : normal S2 [Nonpitting Edema] : nonpitting edema present [Rt] : varicose veins of the right leg noted [Lt] : varicose veins of the left leg noted [2+] : left 2+ [Right Carotid Bruit] : no bruit heard over the right carotid [Left Carotid Bruit] : no bruit heard over the left carotid [Clear Lung Fields] : clear lung fields [Good Air Entry] : good air entry [No Respiratory Distress] : no respiratory distress  [Soft] : abdomen soft [Non Tender] : non-tender [No Masses/organomegaly] : no masses/organomegaly [Normal Gait] : normal gait [Normal Bowel Sounds] : normal bowel sounds [No Edema] : no edema [No Cyanosis] : no cyanosis [No Clubbing] : no clubbing [No Varicosities] : no varicosities [No Rash] : no rash [No Skin Lesions] : no skin lesions [No Focal Deficits] : no focal deficits [Moves all extremities] : moves all extremities [Normal Speech] : normal speech [Alert and Oriented] : alert and oriented [Normal memory] : normal memory

## 2025-06-09 NOTE — PLAN
[FreeTextEntry1] : Patient will continue to see her neurosurgeon for follow-up and She was referred to our total joint expert for an evaluation concerning the right knee for possible revision if needed. [FreeTextEntry4] : 1 year with us [FreeTextEntry3] : Poor for complete recovery

## 2025-06-09 NOTE — DISCUSSION/SUMMARY
[FreeTextEntry1] : 69 year woman with a history as listed presents for a followup  Kalani is having more issues consistent with her [place] esophagus.  She recently was t [place] for HTN emergency. She is only able to take Diltiazem 180mg Qday. She was not able to tolerate 360 2/2 leg swelling.  She will continue Isordil (isosorbide) 5mg BID.  higher doses gave her HA. Will add Olmesartan 20mg Qday.  Her lower extremity edema is likely from venous insuff as it did not improved off of the CCB. She will need to see vascular. She will continue lasix 40mg QOD.  She will try to maintain a BP log at home. Reducing dietary salt intake advised.  She will continue with statin therapy to achieve maintain goal LDL<100 or ideally <70. She si having more chest pain. Her EKG did not reveal any significant ischemic changes. She will get a 640 slice cardiac CT to define her coronary anatomy and to rule out significant CAD. Exercise and diet counseling was performed in order to reduce her future cardiovascular risk.  She will followup with me in 4  months or sooner if necessary.     [EKG obtained to assist in diagnosis and management of assessed problem(s)] : EKG obtained to assist in diagnosis and management of assessed problem(s)

## 2025-06-09 NOTE — REASON FOR VISIT
[Follow-Up Visit] : a follow-up visit for [Back Pain] : back pain [FreeTextEntry2] : Follow up evaluation of lumbar spine, bilateral knees and bilateral hips.

## 2025-06-09 NOTE — HISTORY OF PRESENT ILLNESS
[FreeTextEntry1] : 70 year old woman with a history of hypertension, hyperlipidemia, diabetes, fibromyalgia, GERD, ANTIONE presents for a followup visit.   Since her last visit she recently had Botox injections for her [place] esophagus. She recently needed to go to [place] because her BP what severely elevated in the setting of left sided chest pain was non exertional, non-radiating. She has been having intermittent chest pain for the last 2-3 months.  She is now s/p shoulder replacement but reinjured it. She is now pending PT.   She   denies any   PND, orthopnea, near syncope, syncope, stroke like symptoms. Her compression stocking are helping her lower extremity edema. She has mild CASTRO with stairs. She has severe right shoulder pain.   Medication reconciliation performed. She is compliant with her medications.

## 2025-06-09 NOTE — CARDIOLOGY SUMMARY
[de-identified] : Sinus tachycardia -Nonspecific T-abnormality. [de-identified] : 2019 pharm nuc neg spect  [de-identified] : 9/2024 normal Lv function.  10/4/23 normal LV function 12/2021 normal LV function.

## 2025-06-09 NOTE — HISTORY OF PRESENT ILLNESS
[Has the patient missed work because of the injury/illness?] : The patient has missed work because of the injury/illness. [No] : The patient is currently not working. [de-identified] : Patient presents a follow-up visit for lower back and bilateral knees pain. Patient has a Hx of a Left TKR 2010 and Right TKR 2018. The patient states that she continues to have pain pertaining to her lower back and bilateral knees, particularly the right knee, which continues to progress at this time. The patient reports no significant changes to their symptoms since their last visit regarding the lower back.  She states that her symptoms tend to be worse her right compared to her left. She is unable to stand or walk for prolonged periods of time due to her symptoms. Patient is also stating increasing incontinence of the bladder and bowel but has recently found out that the dorsal column stimulator has not been working which is controlling this issue well for her. The patient currently takes Tylenol to aid her current symptoms.  [FreeTextEntry2] : July 20, 2005 [FreeTextEntry1] : Patient presents a follow-up visit for lower back and bilateral knees pain. Patient has a Hx of a Left TKR 2010 and Right TKR 2018. The patient states that she continues to have pain pertaining to her lower back and bilateral knees, particularly the right knee, which continues to progress at this time. The patient reports no significant changes to their symptoms since their last visit regarding the lower back.  She states that her symptoms tend to be worse her right compared to her left. She is unable to stand or walk for prolonged periods of time due to her symptoms. Patient is also stating increasing incontinence of the bladder and bowel but has recently found out that the dorsal column stimulator has not been working which is controlling this issue well for her. The patient currently takes Tylenol to aid her current symptoms.  [FreeTextEntry3] : Standing or sitting for prolonged periods of time, limited bending twisting and lifting [FreeTextEntry4] : Physical therapy, medication, surgery [FreeTextEntry5] : None [FreeTextEntry6] : N/A

## 2025-06-09 NOTE — PHYSICAL EXAM
[LE] : 5/5 motor strength in bilateral lower extremities [Obese] : obese [Normal] : No costovertebral angle tenderness and no spinal tenderness [de-identified] : AP lateral and flexion-extension x-rays obtained of the lumbar spine show a somewhat stable L4-5 spondylolisthesis and multilevel degenerative disc disease and facet arthropathy with a loss of the normal lumbar lordosis.  No obvious bony lesions noted.  AP lateral and sunrise views taken of the right knee reveals the possibility of loosening of the tibial component.

## 2025-07-09 NOTE — DISCUSSION/SUMMARY
[de-identified] : I discussed nonoperative treatment options for their bilateral hip trochanteric bursitis. We discussed nonoperative treatments options including activity modification, therapy, bracing, nonsteroidal anti-inflammatory medications, and injections. The patient would like to continue with nonoperative treatment and would like to proceed with activity modification, antianxiety   discussed with them that I often prescribe an anti-inflammatory that should be taken once a day with meals to decrease pain and expedite symptom relief. They should not take this while also taking Aleve (Naprosyn), Motrin/ Advil (Ibuprofen), Toradol (ketoralac). They must stop taking it if they develops stomach pain, increased bleeding or bruising and they should follow-up with their primary care doctor for routine blood work including kidney function to monitor its effect. While it Is not a habit-forming substance, it should only  be taken as needed and to discontinue use once symptoms have resolved.  She was instead given a Medrol Dosepak   We discussed that when nonoperative treatment is no longer successful and their pain is severe enough that it is affecting their ability to perform activities of daily living, a joint replacement may help them.   My cumulative time spent on this patients visit included: Preparation for the visit, review of the medical records, review of pertinent diagnostic studies, examination and counseling of the patient on the above diagnosis, treatment plan and prognosis, orders of diagnostic tests, medications and/or appropriate procedures and documentation in the medical records of todays visit.

## 2025-07-09 NOTE — PHYSICAL EXAM
[de-identified] : General Appearance / Station: Well developed, well nourished, in no acute distress Orientation: Oriented to person, place, and time Gait & Station: Ambulates without assistive device Neurologic: Normal leg sensation Cardiovascular: Warm extremity Lymphatics: No lymphedema Generalized Ligament Laxity: Normal Stiffness: Normal.   LUMBAR SPINE Nontender at lumbar spine Straight leg raise: Negative Motor: 5/5 motor L2-S1 Sensation Intact. No paresthesias L2-S1   SYMPTOMATIC RIGHT HIP Range of motion: Painless internal and external rotation of the hip. Strength: Within Normal Limits. Negative Trendelenburg sign                                                                    FADIR: Negative Stinchfield: Negative Cory Test: Positive Palpation: TENDER at greater trochanter, Nontender SI joint                   RIGHT KNEE Alignment: Varus Skin: Normal. Effusion: None Quadriceps: Normal Range of motion: Normal PF crepitus: 1+ PF apprehension: None Patella / Patella Tendon: None Palpation: Nontender Meniscus signs: Negative   SYMPTOMATIC LEFT HIP Range of motion: Painless internal and external rotation of the hip. Strength: Within Normal Limits. Negative Trendelenburg sign                                                                    FADIR: Negative Stinchfield: Negative Cory Test: Positive Palpation: TENDER at greater trochanter, Nontender SI joint   LEFT KNEE Alignment: Varus Skin: Normal. Effusion: None Quadriceps: Normal Range of motion: Normal PF crepitus: 1+ PF apprehension: None Patella / Patella Tendon: None Palpation: Nontender Meniscus signs: Negative       [de-identified] : Imaging: AP Pelvis show no significant hip joint space narrowing. There are no signs of fracture. The soft tissues appear unremarkable

## 2025-07-09 NOTE — PROCEDURE
[de-identified] : Injection: Right Greater Trochanter. Indication: Bursitis/ Inflammation A discussion was had with the patient regarding this procedure and all questions were answered. All risks, benefits and alternatives were discussed. These included but were not limited to bleeding, infection, allergic reaction and reaccumulation of fluid. A timeout was done to ensure correct side and patient agreed to the procedure.  A Cahto asha was created on the skin utilizing a plastic needle cap to asha the anticipated point of entry. Alcohol was used to clean the skin, and chloraprep was used to sterilize and prep the area in the greater trochanter at the point of maximum tenderness. Ethyl chloride spray was then used as a topical anesthetic. A 22-gauge needle was used to inject 4cc 1% lidocaine without epinephrine,  and 2cc of 40mg/ml methylprednisolone into the hip. A sterile bandage was then applied. The patient tolerated the procedure well.   Injection: Left Greater Trochanter. Indication: Bursitis/ Inflammation A discussion was had with the patient regarding this procedure and all questions were answered. All risks, benefits and alternatives were discussed. These included but were not limited to bleeding, infection, allergic reaction and reaccumulation of fluid. A timeout was done to ensure correct side and patient agreed to the procedure.  A Cahto asha was created on the skin utilizing a plastic needle cap to asha the anticipated point of entry. Alcohol was used to clean the skin, and chloraprep was used to sterilize and prep the area in the greater trochanter at the point of maximum tenderness. Ethyl chloride spray was then used as a topical anesthetic. A 22-gauge needle was used to inject 4cc 1% lidocaine without epinephrine,  and 2cc of 40mg/ml methylprednisolone into the hip. A sterile bandage was then applied. The patient tolerated the procedure well.